# Patient Record
Sex: FEMALE | Race: BLACK OR AFRICAN AMERICAN | NOT HISPANIC OR LATINO
[De-identification: names, ages, dates, MRNs, and addresses within clinical notes are randomized per-mention and may not be internally consistent; named-entity substitution may affect disease eponyms.]

---

## 2017-03-19 ENCOUNTER — RESULT REVIEW (OUTPATIENT)
Age: 47
End: 2017-03-19

## 2017-05-17 ENCOUNTER — EMERGENCY (EMERGENCY)
Facility: HOSPITAL | Age: 47
LOS: 1 days | Discharge: HOME | End: 2017-05-17
Attending: EMERGENCY MEDICINE
Payer: COMMERCIAL

## 2017-05-17 VITALS
DIASTOLIC BLOOD PRESSURE: 93 MMHG | WEIGHT: 176.37 LBS | RESPIRATION RATE: 22 BRPM | OXYGEN SATURATION: 100 % | HEART RATE: 78 BPM | SYSTOLIC BLOOD PRESSURE: 178 MMHG

## 2017-05-17 DIAGNOSIS — Z98.89 OTHER SPECIFIED POSTPROCEDURAL STATES: Chronic | ICD-10-CM

## 2017-05-17 DIAGNOSIS — R07.9 CHEST PAIN, UNSPECIFIED: ICD-10-CM

## 2017-05-17 DIAGNOSIS — R20.2 PARESTHESIA OF SKIN: ICD-10-CM

## 2017-05-17 DIAGNOSIS — Z88.0 ALLERGY STATUS TO PENICILLIN: ICD-10-CM

## 2017-05-17 LAB
ALBUMIN SERPL ELPH-MCNC: 4 G/DL — SIGNIFICANT CHANGE UP (ref 3.3–5)
ALP SERPL-CCNC: 55 U/L — SIGNIFICANT CHANGE UP (ref 40–120)
ALT FLD-CCNC: 38 U/L — SIGNIFICANT CHANGE UP (ref 12–78)
ANION GAP SERPL CALC-SCNC: 9 MMOL/L — SIGNIFICANT CHANGE UP (ref 5–17)
APTT BLD: 31.5 SEC — SIGNIFICANT CHANGE UP (ref 27.5–37.4)
AST SERPL-CCNC: 25 U/L — SIGNIFICANT CHANGE UP (ref 15–37)
BASOPHILS # BLD AUTO: 0.1 K/UL — SIGNIFICANT CHANGE UP (ref 0–0.2)
BASOPHILS NFR BLD AUTO: 1.4 % — SIGNIFICANT CHANGE UP (ref 0–2)
BILIRUB SERPL-MCNC: 0.3 MG/DL — SIGNIFICANT CHANGE UP (ref 0.2–1.2)
BUN SERPL-MCNC: 12 MG/DL — SIGNIFICANT CHANGE UP (ref 7–23)
CALCIUM SERPL-MCNC: 8.8 MG/DL — SIGNIFICANT CHANGE UP (ref 8.5–10.1)
CHLORIDE SERPL-SCNC: 105 MMOL/L — SIGNIFICANT CHANGE UP (ref 96–108)
CO2 SERPL-SCNC: 28 MMOL/L — SIGNIFICANT CHANGE UP (ref 22–31)
CREAT SERPL-MCNC: 0.87 MG/DL — SIGNIFICANT CHANGE UP (ref 0.5–1.3)
D DIMER BLD IA.RAPID-MCNC: <150 NG/ML DDU — SIGNIFICANT CHANGE UP
EOSINOPHIL # BLD AUTO: 0.2 K/UL — SIGNIFICANT CHANGE UP (ref 0–0.5)
EOSINOPHIL NFR BLD AUTO: 2.7 % — SIGNIFICANT CHANGE UP (ref 0–6)
GLUCOSE SERPL-MCNC: 124 MG/DL — HIGH (ref 70–99)
HCT VFR BLD CALC: 37.3 % — SIGNIFICANT CHANGE UP (ref 34.5–45)
HGB BLD-MCNC: 13.6 G/DL — SIGNIFICANT CHANGE UP (ref 11.5–15.5)
INR BLD: 1 RATIO — SIGNIFICANT CHANGE UP (ref 0.88–1.16)
LYMPHOCYTES # BLD AUTO: 2.2 K/UL — SIGNIFICANT CHANGE UP (ref 1–3.3)
LYMPHOCYTES # BLD AUTO: 28.8 % — SIGNIFICANT CHANGE UP (ref 13–44)
MCHC RBC-ENTMCNC: 30.8 PG — SIGNIFICANT CHANGE UP (ref 27–34)
MCHC RBC-ENTMCNC: 36.6 GM/DL — HIGH (ref 32–36)
MCV RBC AUTO: 84 FL — SIGNIFICANT CHANGE UP (ref 80–100)
MONOCYTES # BLD AUTO: 0.5 K/UL — SIGNIFICANT CHANGE UP (ref 0–0.9)
MONOCYTES NFR BLD AUTO: 6.3 % — SIGNIFICANT CHANGE UP (ref 2–14)
NEUTROPHILS # BLD AUTO: 4.6 K/UL — SIGNIFICANT CHANGE UP (ref 1.8–7.4)
NEUTROPHILS NFR BLD AUTO: 60.8 % — SIGNIFICANT CHANGE UP (ref 43–77)
PLATELET # BLD AUTO: 279 K/UL — SIGNIFICANT CHANGE UP (ref 150–400)
POTASSIUM SERPL-MCNC: 3.7 MMOL/L — SIGNIFICANT CHANGE UP (ref 3.5–5.3)
POTASSIUM SERPL-SCNC: 3.7 MMOL/L — SIGNIFICANT CHANGE UP (ref 3.5–5.3)
PROT SERPL-MCNC: 7.8 GM/DL — SIGNIFICANT CHANGE UP (ref 6–8.3)
PROTHROM AB SERPL-ACNC: 10.9 SEC — SIGNIFICANT CHANGE UP (ref 9.8–12.7)
RBC # BLD: 4.44 M/UL — SIGNIFICANT CHANGE UP (ref 3.8–5.2)
RBC # FLD: 12.4 % — SIGNIFICANT CHANGE UP (ref 11–15)
SODIUM SERPL-SCNC: 142 MMOL/L — SIGNIFICANT CHANGE UP (ref 135–145)
TROPONIN I SERPL-MCNC: <.015 NG/ML — SIGNIFICANT CHANGE UP (ref 0.01–0.04)
WBC # BLD: 7.6 K/UL — SIGNIFICANT CHANGE UP (ref 3.8–10.5)
WBC # FLD AUTO: 7.6 K/UL — SIGNIFICANT CHANGE UP (ref 3.8–10.5)

## 2017-05-17 PROCEDURE — 99285 EMERGENCY DEPT VISIT HI MDM: CPT | Mod: 25

## 2017-05-17 PROCEDURE — 71010: CPT | Mod: 26

## 2017-05-17 RX ORDER — KETOROLAC TROMETHAMINE 30 MG/ML
30 SYRINGE (ML) INJECTION ONCE
Qty: 0 | Refills: 0 | Status: DISCONTINUED | OUTPATIENT
Start: 2017-05-17 | End: 2017-05-17

## 2017-05-17 RX ORDER — METHOCARBAMOL 500 MG/1
750 TABLET, FILM COATED ORAL ONCE
Qty: 0 | Refills: 0 | Status: COMPLETED | OUTPATIENT
Start: 2017-05-17 | End: 2017-05-17

## 2017-05-17 RX ORDER — SODIUM CHLORIDE 9 MG/ML
1000 INJECTION INTRAMUSCULAR; INTRAVENOUS; SUBCUTANEOUS ONCE
Qty: 0 | Refills: 0 | Status: COMPLETED | OUTPATIENT
Start: 2017-05-17 | End: 2017-05-17

## 2017-05-17 RX ADMIN — SODIUM CHLORIDE 1000 MILLILITER(S): 9 INJECTION INTRAMUSCULAR; INTRAVENOUS; SUBCUTANEOUS at 23:08

## 2017-05-17 RX ADMIN — Medication 30 MILLIGRAM(S): at 23:09

## 2017-05-17 RX ADMIN — METHOCARBAMOL 750 MILLIGRAM(S): 500 TABLET, FILM COATED ORAL at 23:09

## 2017-05-17 NOTE — ED PROVIDER NOTE - OBJECTIVE STATEMENT
Pertinent PMH/PSH/FHx/SHx and Review of Systems contained within:  Patient is well appearing denying any pmh, presents to the ED for chest pain x2 weeks.  Says that she woke up and noted soreness in the chest, under left axilla radiating across chest and around to the back.  Taking advil which helped at first but pain is sharp, getting worse, and not relieved with advil.  Patient denies any associated shortness of breath, palpitations, nausea, vomiting, or cough.  Denies any leg pain/calf swelling, or use of exogenous estrogen.  The pain that she has in her back is present for over 1 year.  Patient denies any falls, injuries, or heavy lifting.  Family history of CVA.  Patient denies EtOH/tobacco/illicit substance use.    No fever/chills, No headache/photophobia/eye pain/changes in vision, No ear pain/sore throat/dysphagia, No palpitations, no SOB/cough/wheeze/stridor, No abdominal pain, No N/V/D, no dysuria/frequency/discharge, No neck/back pain, no rash, no changes in neurological status/function. Pertinent PMH/PSH/FHx/SHx and Review of Systems contained within:  Patient is well appearing denying any pmh, presents to the ED for chest pain x2 weeks.  Says that she woke up and noted soreness in the chest, under left axilla radiating across chest and around to the back.  Also reports tingling in the left arm which she has had for a long time.  Taking advil which helped at first but pain is sharp, getting worse, and not relieved with advil.  Patient denies any associated shortness of breath, palpitations, nausea, vomiting, or cough.  Denies any leg pain/calf swelling, or use of exogenous estrogen.  The pain that she has in her back is present for over 1 year.  Patient denies any falls, injuries, or heavy lifting.  Family history of CVA.  Patient denies EtOH/tobacco/illicit substance use.    No fever/chills, No headache/photophobia/eye pain/changes in vision, No ear pain/sore throat/dysphagia, No palpitations, no SOB/cough/wheeze/stridor, No abdominal pain, No N/V/D, no dysuria/frequency/discharge, No neck/back pain, no rash, no changes in neurological status/function.

## 2017-05-17 NOTE — ED PROVIDER NOTE - MEDICAL DECISION MAKING DETAILS
Patient with left sided chest pain for weeks now.  VSS.  Labs, EKG, CXR, CT chest without significant findings. Based on description and chronicity of pain, suspect that it is musculoskeletal versus radiculopathy from back pain given tingling in left arm.  Patient still advised to have outpatient stress test done for completion.  Wells Score 0.  Patient felt much better with medications in the ER.  Discussed results and outcome of today's visit with the patient.  Patient advised to please follow up with their primary care doctor within the next 24 hours and return to the Emergency Department for worsening symptoms or any other concerns.  Patient advised that their doctor may call  to follow up on the specific results of the tests performed today in the emergency department.   Patient appears well on discharge.  Patient given prescription medications for their condition and advised to take them as prescribed and check with their Primary Care Provider if any questions arise.

## 2017-05-17 NOTE — ED ADULT NURSE NOTE - OBJECTIVE STATEMENT
pt came in with c/o chest pain for 2 weeks that got worst today with sob and radiating to the left arm , will monitor.

## 2017-05-18 LAB — HCG SERPL-ACNC: 2 MIU/ML — SIGNIFICANT CHANGE UP

## 2017-05-18 PROCEDURE — 71260 CT THORAX DX C+: CPT | Mod: 26

## 2017-05-18 RX ORDER — MORPHINE SULFATE 50 MG/1
4 CAPSULE, EXTENDED RELEASE ORAL ONCE
Qty: 0 | Refills: 0 | Status: DISCONTINUED | OUTPATIENT
Start: 2017-05-18 | End: 2017-05-18

## 2017-05-18 RX ORDER — METHOCARBAMOL 500 MG/1
1 TABLET, FILM COATED ORAL
Qty: 12 | Refills: 0 | OUTPATIENT
Start: 2017-05-18 | End: 2017-05-22

## 2017-05-18 RX ORDER — TRAMADOL HYDROCHLORIDE 50 MG/1
50 TABLET ORAL ONCE
Qty: 0 | Refills: 0 | Status: DISCONTINUED | OUTPATIENT
Start: 2017-05-18 | End: 2017-05-18

## 2017-05-18 RX ADMIN — Medication 30 MILLIGRAM(S): at 01:03

## 2017-05-18 RX ADMIN — TRAMADOL HYDROCHLORIDE 50 MILLIGRAM(S): 50 TABLET ORAL at 01:54

## 2017-06-08 ENCOUNTER — RESULT REVIEW (OUTPATIENT)
Age: 47
End: 2017-06-08

## 2017-12-28 NOTE — ED ADULT NURSE NOTE - ED STAT RN HANDOFF TIME
GENERAL SURGERY DISCHARGE INSTRUCTIONS    Medications used with general anesthesia may stay in your system 24 hours following the procedure.  These may cause you to feel sleepy or drowsy.  Therefore, for at least 24 hours after leaving the hospital:     1. You must be accompanied by a responsible adult upon discharge and we recommend they stay with you to help care for you.  2. Do not make important decisions, or sign contracts.  3. Do not operate potentially dangerous machinery, power tools, or appliances.  4. Do not drive a motor vehicle.  5. Do not drink alcoholic beverages, or take sleeping pills/tranquilizers.  6. Do not smoke.    DIET:  Low fat    NAUSEA AND VOMITING:  Nausea and vomiting may occur as you become more active or begin to increase food intake.  If this should happen simply decrease activities and return to clear liquids.  If the problem should persist longer than 24 - 48 hours, call your physician.    PAIN:        Pain Rating Scale:  5-4-3-3-4-5-6-7-8-9-10                                        No         Mod           Worst                                      Pain        Pain         Possible pain    You may have some pain after your surgery.  A prescription for pain medication may be given to you by your surgeon.  Take this medication as directed.  If it does not help the discomfort, contact your surgeon.  If you have received no prescription, you may take non-prescription, non-asprin medication: Advil - Motrin - Ibuprofen.  Be sure to follow label directions.  All pain medication should be taken with some food to prevent stomach upset.  If this does not adequately relieve the pain, call your surgeon.  Use the pain rating scale to describe your pain to medical personnel.    MEDICATIONS:  When you get your prescription filled, please ask the pharmacist about any risks or cautions related to your medications.  CAUTION: DO NOT exceed Greater than 4 grams of Tylenol in 24 hours!       ACTIVITY:      Gradually return to normal activity beginning the day after surgery as tolerated  Light activity  Stair climbing ok  Lifting ok / Limit to less than 20 pounds for  2  weeks    Driving: Resume when off narcotic pain medication  Tub bath ok in  in 1-2 weeks    Shower ok after  2 days      WOUND CARE:    Keep wound clean and dry  Keep dressing clean and dry  DO NOT remove dermabond  Ice bag to incision 24 - 48 hours    There may be bruising in the lower abdomen and genitals, if it is concerning or enlarging, please call the office.    WHEN TO CALL THE SURGEON:  · If dressing saturated with blood, apply pressure with towel to stop, or place clean dressing over saturated one and call your surgeon.  · Fever over 101 degrees (orally).  · Pain not controlled by pain medications.  · If affected extremity becomes white/blue/cold.  · Problems with urination.  · Unusual or foul smelling drainage from the incision.  · Increased redness or swelling around the incision, or of affected extremity, after first 48 hours.    If you have any concerns or problems related to your procedure, please don't hesitate to call me:   Clinic:  [phone]     Tru Llamas, DO         03:38

## 2018-10-17 ENCOUNTER — APPOINTMENT (OUTPATIENT)
Dept: INTERNAL MEDICINE | Facility: CLINIC | Age: 48
End: 2018-10-17
Payer: COMMERCIAL

## 2018-10-17 VITALS
OXYGEN SATURATION: 98 % | HEIGHT: 64 IN | HEART RATE: 75 BPM | DIASTOLIC BLOOD PRESSURE: 80 MMHG | SYSTOLIC BLOOD PRESSURE: 120 MMHG | BODY MASS INDEX: 29.88 KG/M2 | WEIGHT: 175 LBS | RESPIRATION RATE: 16 BRPM | TEMPERATURE: 98.3 F

## 2018-10-17 DIAGNOSIS — Z00.00 ENCOUNTER FOR GENERAL ADULT MEDICAL EXAMINATION W/OUT ABNORMAL FINDINGS: ICD-10-CM

## 2018-10-17 DIAGNOSIS — Z87.891 PERSONAL HISTORY OF NICOTINE DEPENDENCE: ICD-10-CM

## 2018-10-17 DIAGNOSIS — Z87.19 PERSONAL HISTORY OF OTHER DISEASES OF THE DIGESTIVE SYSTEM: ICD-10-CM

## 2018-10-17 DIAGNOSIS — R20.2 PARESTHESIA OF SKIN: ICD-10-CM

## 2018-10-17 DIAGNOSIS — Z77.120 CONTACT WITH AND (SUSPECTED) EXPOSURE TO MOLD (TOXIC): ICD-10-CM

## 2018-10-17 DIAGNOSIS — J45.909 UNSPECIFIED ASTHMA, UNCOMPLICATED: ICD-10-CM

## 2018-10-17 DIAGNOSIS — M17.0 BILATERAL PRIMARY OSTEOARTHRITIS OF KNEE: ICD-10-CM

## 2018-10-17 DIAGNOSIS — Z83.3 FAMILY HISTORY OF DIABETES MELLITUS: ICD-10-CM

## 2018-10-17 DIAGNOSIS — Z80.8 FAMILY HISTORY OF MALIGNANT NEOPLASM OF OTHER ORGANS OR SYSTEMS: ICD-10-CM

## 2018-10-17 DIAGNOSIS — R76.8 OTHER SPECIFIED ABNORMAL IMMUNOLOGICAL FINDINGS IN SERUM: ICD-10-CM

## 2018-10-17 DIAGNOSIS — Z82.3 FAMILY HISTORY OF STROKE: ICD-10-CM

## 2018-10-17 DIAGNOSIS — Z88.9 ALLERGY STATUS TO UNSPECIFIED DRUGS, MEDICAMENTS AND BIOLOGICAL SUBSTANCES: ICD-10-CM

## 2018-10-17 DIAGNOSIS — Z82.69 FAMILY HISTORY OF OTHER DISEASES OF THE MUSCULOSKELETAL SYSTEM AND CONNECTIVE TISSUE: ICD-10-CM

## 2018-10-17 PROCEDURE — G0009: CPT

## 2018-10-17 PROCEDURE — 99386 PREV VISIT NEW AGE 40-64: CPT | Mod: 25

## 2018-10-17 PROCEDURE — 36415 COLL VENOUS BLD VENIPUNCTURE: CPT

## 2018-10-17 PROCEDURE — 90732 PPSV23 VACC 2 YRS+ SUBQ/IM: CPT

## 2018-10-17 PROCEDURE — 90715 TDAP VACCINE 7 YRS/> IM: CPT

## 2018-10-17 PROCEDURE — 90471 IMMUNIZATION ADMIN: CPT

## 2018-10-17 RX ORDER — EPINEPHRINE 0.3 MG/.3ML
0.3 INJECTION INTRAMUSCULAR
Refills: 0 | Status: ACTIVE | COMMUNITY

## 2018-10-17 NOTE — HEALTH RISK ASSESSMENT
[Patient reported PAP Smear was normal] : Patient reported PAP Smear was normal [Patient reported mammogram was normal] : Patient reported mammogram was normal [0] : 2) Feeling down, depressed, or hopeless: Not at all (0) [Patient reported colonoscopy was normal] : Patient reported colonoscopy was normal [HIV test declined] : HIV test declined [Hepatitis C test offered] : Hepatitis C test offered [MammogramDate] : 6/18 [PapSmearDate] : 6/17 [ColonoscopyDate] : 2016

## 2018-10-17 NOTE — PHYSICAL EXAM
[No Acute Distress] : no acute distress [Well-Appearing] : well-appearing [Normal Sclera/Conjunctiva] : normal sclera/conjunctiva [PERRL] : pupils equal round and reactive to light [EOMI] : extraocular movements intact [Normal Outer Ear/Nose] : the outer ears and nose were normal in appearance [Normal Oropharynx] : the oropharynx was normal [Normal TMs] : both tympanic membranes were normal [Supple] : supple [No Lymphadenopathy] : no lymphadenopathy [Thyroid Normal, No Nodules] : the thyroid was normal and there were no nodules present [No Respiratory Distress] : no respiratory distress  [Clear to Auscultation] : lungs were clear to auscultation bilaterally [No Accessory Muscle Use] : no accessory muscle use [Normal Rate] : normal rate  [Regular Rhythm] : with a regular rhythm [Normal S1, S2] : normal S1 and S2 [No Murmur] : no murmur heard [Pedal Pulses Present] : the pedal pulses are present [No Edema] : there was no peripheral edema [Soft] : abdomen soft [Non Tender] : non-tender [No HSM] : no HSM [Normal Supraclavicular Nodes] : no supraclavicular lymphadenopathy [Normal Posterior Cervical Nodes] : no posterior cervical lymphadenopathy [Normal Anterior Cervical Nodes] : no anterior cervical lymphadenopathy [No CVA Tenderness] : no CVA  tenderness [No Spinal Tenderness] : no spinal tenderness [No Joint Swelling] : no joint swelling [No Rash] : no rash [Normal Gait] : normal gait [No Focal Deficits] : no focal deficits [Normal Affect] : the affect was normal [Alert and Oriented x3] : oriented to person, place, and time [de-identified] : no photophobia [de-identified] : mild b/l turbinate edema, no d/c; no sinus tenderness, +cobblestoning [de-identified] : scattered freckles

## 2018-10-17 NOTE — REVIEW OF SYSTEMS
[Negative] : Psychiatric [Fever] : no fever [Chills] : no chills [Chest Pain] : no chest pain [Dizziness] : no dizziness [Unsteady Walking] : no ataxia [FreeTextEntry4] : see HPI [FreeTextEntry6] : see HPI [FreeTextEntry9] : see HPI [de-identified] : see HPI

## 2018-10-17 NOTE — ASSESSMENT
[FreeTextEntry1] : hx hemorrhoids-\par -hx colonoscopy 2016, told nl (hx hemorrhoids) and repeat in 5 yrs per pt\par -denies hx IBD (informed of path report in chart with +IBD hx- denies hx and plans to f/u with GI)\par -follows with GI, Dr. Galloway- last seen 2017- will f/u\par -asked to forward records\par \par hx OA knees- hx steroid injection\par -followed by ortho, Dr. Payne- awaiting approval for further injection, states awaiting call back\par -hx MRI 10/18\par -hx nabumetone prn, helped some but not taking any longer\par -occ cane use, but not recently\par -hx tongue swelling with aleve\par -asked to forward records\par \par hx seasonal allergies, mold exposure since 2016 (from apt, reported to [facility])- asthma dx'd 2016 after PFTs\par -followed by A/I, Dr. Cash\par -hx allergy testing 10/18- told showed increased mold exposure c/w prior\par -has MDI, using 1x/mo\par -denies intubation/PNA, hx oral steroids- last  7 mo ago\par -hx tongue swelling with Tylenol, aleve, Singulair, claritin and allegra- to f/u with A/I re: testing, awaiting possible Allergy shots per pt\par -asked to forward records\par \par hx +LEÓN (per A/I on w/u of allergies per pt)- \par -hx rheum eval, Dr. Burnham -seen in 2016, hx repeat blood testing including LEÓN, told all nl and no further rheum eval needed per pt.\par -asked to forward records\par \par hx tingling in b/l fingers and feet/toes- x 1 mo\par -following with neuro, Dr. Arita ([facility])- seen 2 weeks ago, had MRI brain/neck/spine- results pending.  Had labs at visit- results pending.  Plans to f/u soon.\par -asked to forward records\par \par \par \par HCM\par -fasting screening labs; declines STD/HIV screening, willing for hep C screening\par -declines flu shot\par -Tdap today\par -PVX today\par -hx negative PAP 6/17 by GYN, referral for new given per request\par -hx negative mammo 6/18 per pt, to forward record\par -hx screening colonoscopy 2016: told nl and repeat in 5 yrs per pt.  Asked to forward record.\par -derm referral for screening.  Regular use of sun block for skin cancer prevention advised.\par -hx optho eval 2/18, yearly advised\par -yearly dental screening advised\par -cont'd smoking cessation encouraged\par Pt's cell: [phone]

## 2018-10-19 DIAGNOSIS — R73.03 PREDIABETES.: ICD-10-CM

## 2018-10-19 DIAGNOSIS — E78.5 HYPERLIPIDEMIA, UNSPECIFIED: ICD-10-CM

## 2018-10-19 LAB
25(OH)D3 SERPL-MCNC: 27.4 NG/ML
ALBUMIN SERPL ELPH-MCNC: 4.9 G/DL
ALP BLD-CCNC: 64 U/L
ALT SERPL-CCNC: 31 U/L
ANION GAP SERPL CALC-SCNC: 15 MMOL/L
APPEARANCE: CLEAR
AST SERPL-CCNC: 29 U/L
BASOPHILS # BLD AUTO: 0.02 K/UL
BASOPHILS NFR BLD AUTO: 0.3 %
BILIRUB SERPL-MCNC: 0.3 MG/DL
BILIRUBIN URINE: NEGATIVE
BLOOD URINE: NEGATIVE
BUN SERPL-MCNC: 14 MG/DL
CALCIUM SERPL-MCNC: 9.7 MG/DL
CHLORIDE SERPL-SCNC: 105 MMOL/L
CHOLEST SERPL-MCNC: 202 MG/DL
CHOLEST/HDLC SERPL: 3.4 RATIO
CO2 SERPL-SCNC: 25 MMOL/L
COLOR: YELLOW
CREAT SERPL-MCNC: 0.83 MG/DL
EOSINOPHIL # BLD AUTO: 0.06 K/UL
EOSINOPHIL NFR BLD AUTO: 1 %
FOLATE SERPL-MCNC: >20 NG/ML
GLUCOSE QUALITATIVE U: NEGATIVE MG/DL
GLUCOSE SERPL-MCNC: 141 MG/DL
HBA1C MFR BLD HPLC: 6.3 %
HCT VFR BLD CALC: 42.5 %
HCV AB SER QL: NONREACTIVE
HCV S/CO RATIO: 0.19 S/CO
HDLC SERPL-MCNC: 59 MG/DL
HGB BLD-MCNC: 13.4 G/DL
IMM GRANULOCYTES NFR BLD AUTO: 0.2 %
KETONES URINE: NEGATIVE
LDLC SERPL CALC-MCNC: 130 MG/DL
LEUKOCYTE ESTERASE URINE: NEGATIVE
LYMPHOCYTES # BLD AUTO: 1.73 K/UL
LYMPHOCYTES NFR BLD AUTO: 28 %
MAN DIFF?: NORMAL
MCHC RBC-ENTMCNC: 28.2 PG
MCHC RBC-ENTMCNC: 31.5 GM/DL
MCV RBC AUTO: 89.5 FL
MONOCYTES # BLD AUTO: 0.38 K/UL
MONOCYTES NFR BLD AUTO: 6.2 %
NEUTROPHILS # BLD AUTO: 3.97 K/UL
NEUTROPHILS NFR BLD AUTO: 64.3 %
NITRITE URINE: NEGATIVE
PH URINE: 6.5
PLATELET # BLD AUTO: 348 K/UL
POTASSIUM SERPL-SCNC: 4.5 MMOL/L
PROT SERPL-MCNC: 8.2 G/DL
PROTEIN URINE: NEGATIVE MG/DL
RBC # BLD: 4.75 M/UL
RBC # FLD: 14.4 %
SODIUM SERPL-SCNC: 145 MMOL/L
SPECIFIC GRAVITY URINE: 1.01
TRIGL SERPL-MCNC: 66 MG/DL
TSH SERPL-ACNC: 1.38 UIU/ML
UROBILINOGEN URINE: NEGATIVE MG/DL
VIT B12 SERPL-MCNC: 780 PG/ML
WBC # FLD AUTO: 6.17 K/UL

## 2018-10-30 DIAGNOSIS — N28.1 CYST OF KIDNEY, ACQUIRED: ICD-10-CM

## 2018-10-31 DIAGNOSIS — Z86.39 PERSONAL HISTORY OF OTHER ENDOCRINE, NUTRITIONAL AND METABOLIC DISEASE: ICD-10-CM

## 2018-11-05 ENCOUNTER — APPOINTMENT (OUTPATIENT)
Dept: POPULATION HEALTH | Facility: CLINIC | Age: 48
End: 2018-11-05
Payer: COMMERCIAL

## 2018-11-05 VITALS
SYSTOLIC BLOOD PRESSURE: 143 MMHG | DIASTOLIC BLOOD PRESSURE: 91 MMHG | HEIGHT: 64 IN | RESPIRATION RATE: 16 BRPM | HEART RATE: 71 BPM | WEIGHT: 173 LBS | OXYGEN SATURATION: 99 % | BODY MASS INDEX: 29.53 KG/M2 | TEMPERATURE: 98.2 F

## 2018-11-05 DIAGNOSIS — H92.09 OTALGIA, UNSPECIFIED EAR: ICD-10-CM

## 2018-11-05 DIAGNOSIS — J34.9 UNSPECIFIED DISORDER OF NOSE AND NASAL SINUSES: ICD-10-CM

## 2018-11-05 DIAGNOSIS — R63.0 ANOREXIA: ICD-10-CM

## 2018-11-05 PROCEDURE — 99205 OFFICE O/P NEW HI 60 MIN: CPT

## 2018-11-14 ENCOUNTER — FORM ENCOUNTER (OUTPATIENT)
Age: 48
End: 2018-11-14

## 2018-11-14 ENCOUNTER — APPOINTMENT (OUTPATIENT)
Dept: ULTRASOUND IMAGING | Facility: IMAGING CENTER | Age: 48
End: 2018-11-14

## 2018-11-14 DIAGNOSIS — Z87.442 PERSONAL HISTORY OF URINARY CALCULI: ICD-10-CM

## 2018-11-14 DIAGNOSIS — Z86.39 PERSONAL HISTORY OF OTHER ENDOCRINE, NUTRITIONAL AND METABOLIC DISEASE: ICD-10-CM

## 2018-11-15 ENCOUNTER — OUTPATIENT (OUTPATIENT)
Dept: OUTPATIENT SERVICES | Facility: HOSPITAL | Age: 48
LOS: 1 days | End: 2018-11-15
Payer: COMMERCIAL

## 2018-11-15 ENCOUNTER — APPOINTMENT (OUTPATIENT)
Dept: ULTRASOUND IMAGING | Facility: IMAGING CENTER | Age: 48
End: 2018-11-15

## 2018-11-15 DIAGNOSIS — N28.1 CYST OF KIDNEY, ACQUIRED: ICD-10-CM

## 2018-11-15 DIAGNOSIS — Z98.89 OTHER SPECIFIED POSTPROCEDURAL STATES: Chronic | ICD-10-CM

## 2018-11-15 DIAGNOSIS — Z86.39 PERSONAL HISTORY OF OTHER ENDOCRINE, NUTRITIONAL AND METABOLIC DISEASE: ICD-10-CM

## 2018-11-15 PROCEDURE — 76770 US EXAM ABDO BACK WALL COMP: CPT | Mod: 26

## 2018-11-15 PROCEDURE — 76536 US EXAM OF HEAD AND NECK: CPT | Mod: 26

## 2018-11-15 PROCEDURE — 76536 US EXAM OF HEAD AND NECK: CPT

## 2018-11-15 PROCEDURE — 76770 US EXAM ABDO BACK WALL COMP: CPT

## 2018-11-16 PROBLEM — Z86.39 HISTORY OF THYROID CYST: Status: RESOLVED | Noted: 2018-11-16 | Resolved: 2018-11-16

## 2018-11-16 PROBLEM — Z87.442 HISTORY OF RENAL CALCULI: Status: ACTIVE | Noted: 2018-11-16

## 2019-01-08 ENCOUNTER — APPOINTMENT (OUTPATIENT)
Dept: CHRONIC DISEASE MANAGEMENT | Facility: CLINIC | Age: 49
End: 2019-01-08

## 2019-01-16 ENCOUNTER — APPOINTMENT (OUTPATIENT)
Dept: INTERNAL MEDICINE | Facility: CLINIC | Age: 49
End: 2019-01-16

## 2019-07-16 ENCOUNTER — LABORATORY RESULT (OUTPATIENT)
Age: 49
End: 2019-07-16

## 2019-07-16 ENCOUNTER — APPOINTMENT (OUTPATIENT)
Dept: GASTROENTEROLOGY | Facility: CLINIC | Age: 49
End: 2019-07-16
Payer: COMMERCIAL

## 2019-07-16 VITALS
DIASTOLIC BLOOD PRESSURE: 86 MMHG | SYSTOLIC BLOOD PRESSURE: 135 MMHG | HEART RATE: 82 BPM | BODY MASS INDEX: 29.88 KG/M2 | WEIGHT: 175 LBS | HEIGHT: 64 IN

## 2019-07-16 DIAGNOSIS — R19.8 OTHER SPECIFIED SYMPTOMS AND SIGNS INVOLVING THE DIGESTIVE SYSTEM AND ABDOMEN: ICD-10-CM

## 2019-07-16 PROCEDURE — 99203 OFFICE O/P NEW LOW 30 MIN: CPT | Mod: 25

## 2019-07-16 PROCEDURE — 82272 OCCULT BLD FECES 1-3 TESTS: CPT

## 2019-07-16 PROCEDURE — 36415 COLL VENOUS BLD VENIPUNCTURE: CPT

## 2019-07-16 RX ORDER — ALBUTEROL SULFATE 90 UG/1
108 (90 BASE) AEROSOL, METERED RESPIRATORY (INHALATION)
Qty: 1 | Refills: 5 | Status: DISCONTINUED | COMMUNITY
Start: 2018-10-17 | End: 2019-07-16

## 2019-07-16 RX ORDER — GABAPENTIN 600 MG/1
600 TABLET, COATED ORAL
Refills: 0 | Status: ACTIVE | COMMUNITY

## 2019-07-16 NOTE — HISTORY OF PRESENT ILLNESS
[FreeTextEntry1] : For a couple of weeks, Neida has noted mucus and blood in the stools, with tenesmus, but this has improved since increasing dietary fiber. She had similar issues in 2017; she was evaluated by Dr. Escalona, with colonoscopy 3/20/17 revealing proctitis and hemorrhoids, for which she was started on rectal suppositories and quickly responded. Prior colonoscopy 2001 was reportedly negative. Her father had been diagnosed with colon cancer in his late 50s. She now averages 2-3 BMs/day, with at least one on awakening. She is more likely to see blood on wiping after a hard stool.

## 2019-07-16 NOTE — CONSULT LETTER
[Dear  ___] : Dear  [unfilled], [Consult Letter:] : I had the pleasure of evaluating your patient, [unfilled]. [Please see my note below.] : Please see my note below. [Consult Closing:] : Thank you very much for allowing me to participate in the care of this patient.  If you have any questions, please do not hesitate to contact me. [Sincerely,] : Sincerely, [FreeTextEntry3] : Jackson River M.D.\par  [DrTania  ___] : Dr. MUNGUIA

## 2019-07-16 NOTE — ASSESSMENT
[FreeTextEntry1] : 1. Recent change in bowels with mucous and blood per rectum, tenesmus; proctitis diagnosed at colonoscopy March 2017; family history of colon cancer (father)--suspect mild exacerbation of proctitis.\par 2. Obesity.\par 3. Asthma.\par 4. Hyperlipidemia.\par 5. Prediabetes.\par 6. Ex-smoker.\par 7. Osteoarthritis.\par 8. History of kidney stone.\par 9. Status post right carpal tunnel surgery.\par 10. Allergic to penicillin, Aleve, Allegra, Singulair, Tylenol.\par \par Plan:\par 1. Medical record release for Dr. Escalona.\par 2. Extensive bloodwork drawn by me this afternoon.\par 3. Continue high-fiber diet.\par 4. Samples + Rx for Canasa 1 gm h.s.\par 5. Return here in 4-6 weeks.

## 2019-07-16 NOTE — PHYSICAL EXAM
[General Appearance - Alert] : alert [General Appearance - In No Acute Distress] : in no acute distress [General Appearance - Well Nourished] : well nourished [General Appearance - Well Developed] : well developed [Sclera] : the sclera and conjunctiva were normal [Outer Ear] : the ears and nose were normal in appearance [Oropharynx] : the oropharynx was normal [Neck Appearance] : the appearance of the neck was normal [Neck Cervical Mass (___cm)] : no neck mass was observed [Jugular Venous Distention Increased] : there was no jugular-venous distention [Thyroid Diffuse Enlargement] : the thyroid was not enlarged [Thyroid Nodule] : there were no palpable thyroid nodules [Auscultation Breath Sounds / Voice Sounds] : lungs were clear to auscultation bilaterally [Heart Rate And Rhythm] : heart rate was normal and rhythm regular [Heart Sounds] : normal S1 and S2 [Heart Sounds Gallop] : no gallops [Murmurs] : no murmurs [Heart Sounds Pericardial Friction Rub] : no pericardial rub [Full Pulse] : the pedal pulses are present [Edema] : there was no peripheral edema [Bowel Sounds] : normal bowel sounds [Abdomen Soft] : soft [Abdomen Tenderness] : non-tender [Abdomen Mass (___ Cm)] : no abdominal mass palpated [Abdomen Hernia] : no hernia was discovered [Normal Sphincter Tone] : normal sphincter tone [No Rectal Mass] : no rectal mass [Internal Hemorrhoid] : internal hemorrhoids [Cervical Lymph Nodes Enlarged Posterior Bilaterally] : posterior cervical [Cervical Lymph Nodes Enlarged Anterior Bilaterally] : anterior cervical [Supraclavicular Lymph Nodes Enlarged Bilaterally] : supraclavicular [Axillary Lymph Nodes Enlarged Bilaterally] : axillary [Femoral Lymph Nodes Enlarged Bilaterally] : femoral [Inguinal Lymph Nodes Enlarged Bilaterally] : inguinal [No CVA Tenderness] : no ~M costovertebral angle tenderness [No Spinal Tenderness] : no spinal tenderness [Abnormal Walk] : normal gait [Nail Clubbing] : no clubbing  or cyanosis of the fingernails [Musculoskeletal - Swelling] : no joint swelling seen [Motor Tone] : muscle strength and tone were normal [Skin Color & Pigmentation] : normal skin color and pigmentation [Skin Turgor] : normal skin turgor [] : no rash [Oriented To Time, Place, And Person] : oriented to person, place, and time [Impaired Insight] : insight and judgment were intact [Affect] : the affect was normal [FreeTextEntry1] : striae [External Hemorrhoid] : no external hemorrhoids [Occult Blood Positive] : stool was negative for occult blood

## 2019-07-17 LAB
ALBUMIN SERPL ELPH-MCNC: 4.8 G/DL
ALP BLD-CCNC: 61 U/L
ALT SERPL-CCNC: 29 U/L
ANION GAP SERPL CALC-SCNC: 14 MMOL/L
AST SERPL-CCNC: 28 U/L
BASOPHILS # BLD AUTO: 0.04 K/UL
BASOPHILS NFR BLD AUTO: 0.7 %
BILIRUB DIRECT SERPL-MCNC: 0.1 MG/DL
BILIRUB SERPL-MCNC: 0.4 MG/DL
BUN SERPL-MCNC: 13 MG/DL
CALCIUM SERPL-MCNC: 10.2 MG/DL
CHLORIDE SERPL-SCNC: 102 MMOL/L
CHOLEST SERPL-MCNC: 222 MG/DL
CHOLEST/HDLC SERPL: 4.4 RATIO
CO2 SERPL-SCNC: 24 MMOL/L
CREAT SERPL-MCNC: 0.87 MG/DL
CRP SERPL-MCNC: 0.43 MG/DL
EOSINOPHIL # BLD AUTO: 0.2 K/UL
EOSINOPHIL NFR BLD AUTO: 3.4 %
ERYTHROCYTE [SEDIMENTATION RATE] IN BLOOD BY WESTERGREN METHOD: 43 MM/HR
ESTIMATED AVERAGE GLUCOSE: 134 MG/DL
FERRITIN SERPL-MCNC: 327 NG/ML
GGT SERPL-CCNC: 28 U/L
GLUCOSE SERPL-MCNC: 116 MG/DL
HBA1C MFR BLD HPLC: 6.3 %
HCT VFR BLD CALC: 42.9 %
HDLC SERPL-MCNC: 50 MG/DL
HGB BLD-MCNC: 13.8 G/DL
IGA SER QL IEP: 174 MG/DL
IMM GRANULOCYTES NFR BLD AUTO: 0.2 %
IRON SATN MFR SERPL: 26 %
IRON SERPL-MCNC: 72 UG/DL
LDLC SERPL CALC-MCNC: 150 MG/DL
LYMPHOCYTES # BLD AUTO: 1.69 K/UL
LYMPHOCYTES NFR BLD AUTO: 28.3 %
MAGNESIUM SERPL-MCNC: 1.9 MG/DL
MAN DIFF?: NORMAL
MCHC RBC-ENTMCNC: 29.3 PG
MCHC RBC-ENTMCNC: 32.2 GM/DL
MCV RBC AUTO: 91.1 FL
MONOCYTES # BLD AUTO: 0.4 K/UL
MONOCYTES NFR BLD AUTO: 6.7 %
NEUTROPHILS # BLD AUTO: 3.63 K/UL
NEUTROPHILS NFR BLD AUTO: 60.7 %
PHOSPHATE SERPL-MCNC: 3.2 MG/DL
PLATELET # BLD AUTO: 358 K/UL
POTASSIUM SERPL-SCNC: 4 MMOL/L
PROT SERPL-MCNC: 8.3 G/DL
RBC # BLD: 4.71 M/UL
RBC # FLD: 13.5 %
SODIUM SERPL-SCNC: 140 MMOL/L
T3 SERPL-MCNC: 127 NG/DL
T3RU NFR SERPL: 1.1 TBI
T4 FREE SERPL-MCNC: 1.1 NG/DL
T4 SERPL-MCNC: 6.9 UG/DL
TIBC SERPL-MCNC: 282 UG/DL
TRIGL SERPL-MCNC: 108 MG/DL
TSH SERPL-ACNC: 1.3 UIU/ML
UIBC SERPL-MCNC: 210 UG/DL
WBC # FLD AUTO: 5.97 K/UL

## 2019-07-18 LAB
ENDOMYSIUM IGA SER QL: NEGATIVE
ENDOMYSIUM IGA TITR SER: NORMAL
GLIADIN IGA SER QL: <5 UNITS
GLIADIN IGG SER QL: <5 UNITS
GLIADIN PEPTIDE IGA SER-ACNC: NEGATIVE
GLIADIN PEPTIDE IGG SER-ACNC: NEGATIVE
TTG IGA SER IA-ACNC: <1.2 U/ML
TTG IGA SER-ACNC: NEGATIVE
TTG IGG SER IA-ACNC: <1.2 U/ML
TTG IGG SER IA-ACNC: NEGATIVE

## 2019-08-05 ENCOUNTER — APPOINTMENT (OUTPATIENT)
Dept: INTERNAL MEDICINE | Facility: CLINIC | Age: 49
End: 2019-08-05

## 2019-09-05 ENCOUNTER — APPOINTMENT (OUTPATIENT)
Dept: GASTROENTEROLOGY | Facility: CLINIC | Age: 49
End: 2019-09-05
Payer: COMMERCIAL

## 2019-09-05 VITALS
DIASTOLIC BLOOD PRESSURE: 90 MMHG | HEIGHT: 64 IN | WEIGHT: 177 LBS | HEART RATE: 68 BPM | BODY MASS INDEX: 30.22 KG/M2 | SYSTOLIC BLOOD PRESSURE: 135 MMHG

## 2019-09-05 DIAGNOSIS — K51.211 ULCERATIVE (CHRONIC) PROCTITIS WITH RECTAL BLEEDING: ICD-10-CM

## 2019-09-05 DIAGNOSIS — E66.9 OBESITY, UNSPECIFIED: ICD-10-CM

## 2019-09-05 DIAGNOSIS — Z80.0 FAMILY HISTORY OF MALIGNANT NEOPLASM OF DIGESTIVE ORGANS: ICD-10-CM

## 2019-09-05 DIAGNOSIS — R19.4 CHANGE IN BOWEL HABIT: ICD-10-CM

## 2019-09-05 DIAGNOSIS — M19.90 UNSPECIFIED OSTEOARTHRITIS, UNSPECIFIED SITE: ICD-10-CM

## 2019-09-05 PROCEDURE — 99213 OFFICE O/P EST LOW 20 MIN: CPT

## 2019-09-05 NOTE — ASSESSMENT
[FreeTextEntry1] : 1. 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 \par 2. Obesity.\par 3. Asthma.\par 4. Hyperlipidemia.\par 5. Prediabetes.\par 6. Ex-smoker.\par 7. Osteoarthritis.\par 8. History of kidney stone.\par 9. Status post right carpal tunnel surgery.\par 10. Allergic to penicillin, Aleve, Allegra, Singulair, Tylenol.\par \par Plan:\par 1. Follow-up with Dr. Martinez. Copy of lab report given. \par 2. If recurrent colitic symptoms, return here and would then consider earlier repeat colonoscopy.\par 3. Otherwise, return here in early 2022, to repeat colonoscopy soon thereafter.

## 2019-09-05 NOTE — HISTORY OF PRESENT ILLNESS
[FreeTextEntry1] : After just a few doses of Canasa rectal suppositories, symptoms subsided. She has been averaging 3 "normal" BMs/day. Since last visit, Neida saw bright red blood on wiping only one occasion, shortly after taking a bath. Bloodwork from 7/16 revealed ESR 43, C-reactive protein 0.43 and A1C 6.3. She reports problems with "arthritis."

## 2019-09-05 NOTE — REVIEW OF SYSTEMS
[Dry Eyes] : dryness of the eyes [Leg Claudication] : intermittent leg claudication [Limb Pain] : limb pain [Difficulty Walking] : difficulty walking [Muscle Weakness] : muscle weakness [Negative] : Heme/Lymph [As Noted in HPI] : as noted in HPI

## 2019-09-05 NOTE — CONSULT LETTER
[Dear  ___] : Dear  [unfilled], [Courtesy Letter:] : I had the pleasure of seeing your patient, [unfilled], in my office today. [Please see my note below.] : Please see my note below. [Consult Closing:] : Thank you very much for allowing me to participate in the care of this patient.  If you have any questions, please do not hesitate to contact me. [Sincerely,] : Sincerely, [FreeTextEntry3] : Jackson River M.D.\par

## 2019-10-09 ENCOUNTER — APPOINTMENT (OUTPATIENT)
Dept: GASTROENTEROLOGY | Facility: CLINIC | Age: 49
End: 2019-10-09

## 2020-01-29 ENCOUNTER — APPOINTMENT (OUTPATIENT)
Dept: INTERNAL MEDICINE | Facility: CLINIC | Age: 50
End: 2020-01-29

## 2020-08-10 ENCOUNTER — RX RENEWAL (OUTPATIENT)
Age: 50
End: 2020-08-10

## 2020-08-10 RX ORDER — MESALAMINE 1000 MG/1
1000 SUPPOSITORY RECTAL
Qty: 30 | Refills: 1 | Status: ACTIVE | COMMUNITY
Start: 2019-07-16 | End: 1900-01-01

## 2021-07-27 ENCOUNTER — EMERGENCY (EMERGENCY)
Facility: HOSPITAL | Age: 51
LOS: 1 days | Discharge: HOME | End: 2021-07-27
Admitting: EMERGENCY MEDICINE
Payer: COMMERCIAL

## 2021-07-27 VITALS
OXYGEN SATURATION: 99 % | RESPIRATION RATE: 18 BRPM | TEMPERATURE: 98 F | DIASTOLIC BLOOD PRESSURE: 93 MMHG | HEIGHT: 65.5 IN | HEART RATE: 79 BPM | SYSTOLIC BLOOD PRESSURE: 141 MMHG

## 2021-07-27 DIAGNOSIS — Z98.89 OTHER SPECIFIED POSTPROCEDURAL STATES: Chronic | ICD-10-CM

## 2021-07-27 PROCEDURE — 99283 EMERGENCY DEPT VISIT LOW MDM: CPT | Mod: 25

## 2021-07-27 PROCEDURE — 10061 I&D ABSCESS COMP/MULTIPLE: CPT

## 2021-07-27 PROCEDURE — 73140 X-RAY EXAM OF FINGER(S): CPT | Mod: 26,RT

## 2021-07-27 NOTE — ED PROVIDER NOTE - UPPER EXTREMITY EXAM, RIGHT
+tenderness, redness and swelling at cuticle of 4th digit; no fusiform swelling; no tenderness along flexor tendon sheath; finger not in fixed flexed position; FROM

## 2021-07-27 NOTE — ED PROVIDER NOTE - NSFOLLOWUPINSTRUCTIONS_ED_ALL_ED_FT
Follow up with your PMD or emergency department in 2 days for wound check.  Continue to take all of your daily medications.  Keep covered and dry.  Take Motrin 600 mg (three 200 mg over the counter pills) every 8 hours as needed for moderate pain -- take with food.  Take Tylenol 650mg (Two 325 mg pills) every 4-6 hours as needed for pain or fevers.  Please return immediately to the Emergency Department if you have any worsening or change in your condition or if you have any other problems or concerns at all, including but not limited to increased pain, fevers, chills, swelling of finger, difficulty moving finger, bleeding.

## 2021-07-27 NOTE — ED PROVIDER NOTE - CLINICAL SUMMARY MEDICAL DECISION MAKING FREE TEXT BOX
Pt is a 50 y/o F PMHx "rheumatoid problem of spine" p/w finger pain x 2 weeks. -- paronychia -- xray, I&D

## 2021-07-27 NOTE — ED PROVIDER NOTE - OBJECTIVE STATEMENT
Pt is a 50 y/o F PMHx "rheumatoid problem of spine" p/w finger pain x 2 weeks.  Pt reports, 2 weeks ago, while sweeping, pt struck distal phalanx of right hand 4th digit against countertop with which pt developed pain and swelling.  Pt reports swelling resolved then began to recur 5 days ago.  She report she went to urgent care 5 days ago and was given reassurance and removable splint.  She reports later that day, door struck same region.  Over time pt developed gradually worsening pain and swelling since then, greatest at cuticle.  She was evaluated at urgent care yesterday where she was prescribe Bactrim, with which pt has been compliant.  She was told she had infection of cuticle.  Today, she noted cuticle mildly more swollen.  Pt reports pain improves with ibuprofen.  Pt denies any fevers, chills, numbness, weakness, use of blood thinners or any other specific complaints.

## 2021-07-27 NOTE — ED PROVIDER NOTE - PROGRESS NOTE DETAILS
DISHA Loya:  I&D tolerated well.  Pt medically stable for discharge.  Strict return precautions given. Pt to follow up with PMD or ED for wound check.  If patient has not yet been registered, a member of the registration team has been notified at this time to complete patient's registration.

## 2023-01-10 NOTE — ED PROCEDURE NOTE - CPROC ED SPECIMEN OBTAINED1
wound culture Dupixent Pregnancy And Lactation Text: This medication likely crosses the placenta but the risk for the fetus is uncertain. This medication is excreted in breast milk.

## 2023-07-20 NOTE — HISTORY OF PRESENT ILLNESS
HISTORY OF PRESENT ILLNESS     This is a 36 year old female here today for   Chief Complaint   Patient presents with   • Dizziness   .  HPI    Patient is a 37 yo female with past medical history of prolactinoma here with intermittent dizziness since yesterday.  Patient reports feeling unsteady on her feet last night along with this morning.  Reports that she was going around her house getting things ready this morning when she felt the worst. Feels like she had intermittent blurry vision, felt like she needed to sit closer to the TV or enlarge the font on her phone.  Reports that she has not followed with an optometrist, has a prescription by her PCP but is unsure what insurance will cover. She reports most of her symptoms have greatly improved at this point.  She is able to perform positional changes or perform quick head movements without feeling dizzy.  Denies any current vision changes including flashing lights, floaters, changes in peripheral vision, nuchal rigidity, headache, numbness, tingling, facial droop, slurred speech, unilateral extremity weakness.  She had her prolactinoma level drawn today, results are still pending.  Reports her endocrinologist is following the levels and recently changed her medication approximately 1 month ago.  Patient was previously on phentermine but has not taken a as she has upcoming cervical biopsy and instructed to abstain from phentermine and NSAIDs.  LMP finished a few days ago.  Denies any urinary symptoms including dysuria, urgency or hematuria.  Instructed to increase her water intake and does have some urinary frequency. Able to tolerate normal p.o. without nausea or emesis. Denies fevers, chills, abdominal pain, chest pain or shortness of breath.    PAST MEDICAL, FAMILY AND SOCIAL HISTORY     I have personally reviewed and updated the following EMR sections:  Allergies, Problem List, Past Medical History, Past Surgical History and Social History    REVIEW OF SYSTEMS    Review of Systems  Negative other than above    PHYSICAL EXAM   Blood pressure 134/85, pulse 70, temperature 97.6 °F (36.4 °C), temperature source Oral, height 5' 1\" (1.549 m), weight 95.8 kg (211 lb 1.6 oz), last menstrual period 07/18/2023, SpO2 97 %.  Body mass index is 39.89 kg/m².  Physical Exam  Vitals and nursing note reviewed.   Constitutional:       General: She is not in acute distress.     Appearance: Normal appearance. She is not ill-appearing or toxic-appearing.   HENT:      Head: Normocephalic and atraumatic.      Nose: Nose normal.      Mouth/Throat:      Mouth: Mucous membranes are moist.      Pharynx: Oropharynx is clear. No oropharyngeal exudate or posterior oropharyngeal erythema.   Eyes:      General:         Right eye: No discharge.         Left eye: No discharge.      Extraocular Movements: Extraocular movements intact.      Conjunctiva/sclera: Conjunctivae normal.      Pupils: Pupils are equal, round, and reactive to light.      Comments: No nystagmus   Cardiovascular:      Rate and Rhythm: Normal rate and regular rhythm.      Heart sounds: No murmur heard.  Pulmonary:      Effort: Pulmonary effort is normal. No respiratory distress.      Breath sounds: Normal breath sounds. No stridor. No wheezing, rhonchi or rales.   Musculoskeletal:      Cervical back: Neck supple. No rigidity.   Skin:     Capillary Refill: Capillary refill takes less than 2 seconds.   Neurological:      General: No focal deficit present.      Mental Status: She is alert and oriented to person, place, and time.      Comments: CN II-XII intact, strength 5/5 on upper and lower extremities, hand  intact, sensation intact, no tremor, normal gait, finger to nose intact, able to perform positional changes in quick head movements without any reproducible dizziness              Recent Results (from the past 24 hour(s))   POCT Blood Sugar Hand Held Device    Collection Time: 07/20/23 10:23 AM   Result Value Ref Range     Glucose Blood,  (A) 65 - 99 mg/dL     LABORATORY  I have reviewed the pertinent laboratory tests.  These are the pertinent findings:  · Orthostatics: Negative    ASSESSMENT/PLAN   Frnaca was seen today for dizziness.    Diagnoses and all orders for this visit:    Dizziness  -     POCT Blood Sugar Hand Held Device  -     Nursing communication    Patient is a 35 yo female with past medical history of prolactinoma here with intermittent dizziness since yesterday.  Symptoms continue this morning but reports overall her symptoms have greatly improved.  Denies any current vision changes including flashing lights, floaters, changes in peripheral vision, nuchal rigidity, headache, numbness, tingling, facial droop, slurred speech, unilateral extremity weakness.  She had her prolactinoma level drawn today, results are still pending.  Reports her endocrinologist is following the levels and recently changed her medication approximately 1 month ago. LMP finished a few days ago.  Denies any urinary symptoms.      On exam, patient was afebrile and hemodynamically stable, she is calm and nontoxic appearing.  She is unremarkable cardiac and respiratory exam without any focal deficits.  She does not have any reproducible dizziness with positional changes or quick head movements.  Obtain a POCT glucose which was within normal limit and her orthostatics were negative.  Discussed her dizziness may be multifactorial, reassuring that her symptoms are improving.  Offered patient basic labs including CBC and BMP, POCT pregnancy, POCT urine to evaluate other etiologies of her dizziness.  Patient declines above workup as her last month labs were within normal limit and she has no urinary symptoms.  Low suspicion for CVA as she has no focal deficits on exam, no recent falls, trauma, not on anticoagulation denies any persistent symptoms. Patient had prolactinoma level drawn today, results still pending.  Per chart review patient had an  MRI on 10/2020 and on 07/2017.  Patient will call her endocrinologist for results of her prolactinoma level and further management, may need adjustment of her medications possible repeat imaging.  Low threshold of going to the emergency room or calling 911 if develops recurrence of her dizziness.  Work note provided.  Patient verbalized understanding of plan.          EDUCATION  Franca Sanchez educated as to the above assessment and plan and did express understanding and agreement.    RETURN  Franca Wingley is asked to follow up with PCP/endo in 1-2 days. Patient advised they can return to urgent care or the emergency room if their symptoms worsen and they are unable to see their primary care physician.    For this encounter, the patient was wearing a mask. I was wearing a n95 mask, face shield, and hair mask.     Darrell Chaudhry MD     [Health Maintenance] : health maintenance [___ Year(s) Ago] : [unfilled] year(s) ago [Good] : good [Reg. Dental Visits] : She has regular dental visits [Healthy Diet] : She consumes a diverse and healthy diet [Former Cigarette Smoker] : is a former cigarette smoker [Alcohol Use] : She consumes alcohol [Occasional Use] : occasional alcohol use [Postmenopausal] : the patient is postmenopausal [FreeTextEntry1] : Wanted new PMD, reports last CPE 1 yr ago. [Vision Problems] : She denies vision problems [Hearing Loss] : She denies hearing loss [Tobacco Use] : She does not use tobacco [Drug Abuse] : She denies drug use [de-identified] : declines flu shot, hx Tdap > 10 yrs, no hx shingles vaccine [de-identified] : \par Feeling well today.\par Fasting for labs.\par \par \par hx hemorrhoids-\par -hx colonoscopy 2016, told nl (hx hemorrhoids) and repeat in 5 yrs per pt\par -denies hx IBD (informed of path report in chart with +IBD hx- denies hx and plans to f/u with GI)\par -follows with GI, Dr. Galloway- last seen 2017- will f/u\par \par Reports nl appetite and BMs; denies BRBPR or melena\par -Reports stable wt \par -denies GI complaints.\par \par Eating healthy diet\par No formal exercise, walks often though is limited by knee OA\par \par hx OA knees- hx steroid injection\par -followed by ortho, Dr. Payne- awaiting approval for further injection, states awaiting call back\par -hx MRI 10/18\par -hx nabumetone prn, helped some but not taking any longer\par -occ cane use, but not recently\par -hx tongue swelling with aleve\par \par hx seasonal allergies, mold exposure since 2016 (from apt, reported to [facility])- asthma dx'd 2016 after PFTs\par -followed by A/I, Dr. Cash\par -hx allergy testing 10/18- told showed increased mold exposure c/w prior\par -has MDI, using 1x/mo\par -denies intubation/PNA, hx oral steroids- last  7 mo ago\par -hx tongue swelling with Tylenol, aleve, Singulair, claritin and allegra- to f/u with A/I re: testing, awaiting possible Allergy shots per pt\par \par hx +LEÓN (per A/I on w/u of allergies per pt)- \par -hx rheum eval, Dr. Burnham -seen in 2016, hx repeat blood testing including LEÓN, told all nl and no further rheum eval needed per pt.\par \par hx tingling in b/l fingers and feet/toes- x 1 mo\par -following with neuro, Dr. Arita ([facility])- seen 2 weeks ago, had MRI brain/neck/spine- results pending.  Had labs at visit- results pending.  Plans to f/u soon.\par \par Denies  complaints.\par \par Denies depression or anxiety.\par \par
